# Patient Record
Sex: FEMALE | Race: WHITE
[De-identification: names, ages, dates, MRNs, and addresses within clinical notes are randomized per-mention and may not be internally consistent; named-entity substitution may affect disease eponyms.]

---

## 2019-07-10 NOTE — P.HPOB
History of Present Illness


H&P Date: 07/10/19


Chief Complaint: Induction of labor





This is a 19-year-old female  4 para 0 with an estimated date of 

confinement of 2019, estimated gestational age of 39 and one sevenths 

weeks, who presents to labor and delivery with complaints of contractions that 

have been irregular and pressure.  She desires induction of labor.  Prenatal 

course has been essentially uncomplicated.  She was using marijuana at the 

beginning of the pregnancy for nausea and vomiting and was advised to stop it.  

She has had negative drug screens throughout the rest of her pregnancy.  She 

admits to good fetal movement.





Prenatal labs:


GC/chlamydia-negative


Obstetrical ultrasound-within normal limits


Quad screen-negative


One hour Glucola-128


Group B streptococcus-negative


Blood type-O+


Antibody screen-negative


HIV-nonreactive


Hepatitis B surface antigen-negative


Hemoglobin-13.7


RPR-nonreactive


Rubella-immune





Obstetrical history: .  History of 3 miscarriages.


Gynecologic history: History of chlamydia treated twice in the past.


Social history: She is single.  She is unemployed.





Review of Systems


Constitutional: Denies chills, Denies fever


Eyes: denies blurred vision, denies pain


Ears, nose, mouth and throat: Reports hoarseness


Cardiovascular: Denies chest pain, Denies shortness of breath


Respiratory: Denies cough


Gastrointestinal: Reports abdominal pain (Irregular contractions), Denies 

diarrhea, Denies nausea, Denies vomiting


Genitourinary: Reports pelvic pain, Reports pregnant


Musculoskeletal: Reports low back pain


Integumentary: Denies pruritus, Denies rash


Neurological: Denies numbness, Denies weakness


Psychiatric: Denies anxiety, Denies depression





Past Medical History


Past Medical History: No Reported History


Additional Past Surgical History / Comment(s): D&C


Past Psychological History: No Psychological Hx Reported


Smoking Status: Current every day smoker


Past Drug Use History: Marijuana (History in early pregnancy.)





- Past Family History


  ** Mother


Additional Family Medical History / Comment(s): Heart disease





Medications and Allergies


                                Home Medications











 Medication  Instructions  Recorded  Confirmed  Type


 


Pnv,Calcium 72/Iron/Folic Acid 1 each PO 07/10/19  History





[Prenatal Plus Tablet]    








                                    Allergies











Allergy/AdvReac Type Severity Reaction Status Date / Time


 


No Known Allergies Allergy   Verified 07/10/19 21:02














Exam


Osteopathic Statement: *.  No significant issues noted on an osteopathic 

structural exam other than those noted in the History and Physical/Consult.





HEENT: Within normal limits


Heart: Regular rate and rhythm


Lungs: Clear to auscultation bilaterally


Abdomen: 


Cervix: 1-1/2 cm/60%/-2 station


Fetal heart tones: 130s by Doppler


Extremities: Negative Homans





Assessment and Plan


(1) 39 weeks gestation of pregnancy


Status: Acute   Code(s): Z3A.39 - 39 WEEKS GESTATION OF PREGNANCY   SNOMED 

Code(s): 40209101


   


Plan: 





Proceed with oxytocin induction of labor.  Expectant management.  Epidural 

anesthesia if desired.

## 2019-07-11 ENCOUNTER — HOSPITAL ENCOUNTER (INPATIENT)
Dept: HOSPITAL 47 - 4FBP | Age: 19
LOS: 2 days | Discharge: HOME | End: 2019-07-13
Attending: OBSTETRICS & GYNECOLOGY | Admitting: OBSTETRICS & GYNECOLOGY
Payer: COMMERCIAL

## 2019-07-11 VITALS — BODY MASS INDEX: 38 KG/M2

## 2019-07-11 DIAGNOSIS — F17.200: ICD-10-CM

## 2019-07-11 DIAGNOSIS — Z86.19: ICD-10-CM

## 2019-07-11 DIAGNOSIS — Z82.49: ICD-10-CM

## 2019-07-11 DIAGNOSIS — Z3A.39: ICD-10-CM

## 2019-07-11 LAB
BASOPHILS # BLD AUTO: 0 K/UL (ref 0–0.2)
BASOPHILS NFR BLD AUTO: 0 %
EOSINOPHIL # BLD AUTO: 0.2 K/UL (ref 0–0.7)
EOSINOPHIL NFR BLD AUTO: 2 %
ERYTHROCYTE [DISTWIDTH] IN BLOOD BY AUTOMATED COUNT: 3.87 M/UL (ref 3.8–5.4)
ERYTHROCYTE [DISTWIDTH] IN BLOOD: 12.9 % (ref 11.5–15.5)
HCT VFR BLD AUTO: 37.5 % (ref 34–46)
HGB BLD-MCNC: 12.5 GM/DL (ref 11.4–16)
LYMPHOCYTES # SPEC AUTO: 3 K/UL (ref 1–4.8)
LYMPHOCYTES NFR SPEC AUTO: 23 %
MCH RBC QN AUTO: 32.2 PG (ref 25–35)
MCHC RBC AUTO-ENTMCNC: 33.3 G/DL (ref 31–37)
MCV RBC AUTO: 96.9 FL (ref 80–100)
MONOCYTES # BLD AUTO: 0.5 K/UL (ref 0–1)
MONOCYTES NFR BLD AUTO: 4 %
NEUTROPHILS # BLD AUTO: 9.1 K/UL (ref 1.3–7.7)
NEUTROPHILS NFR BLD AUTO: 70 %
PLATELET # BLD AUTO: 257 K/UL (ref 150–450)
WBC # BLD AUTO: 12.9 K/UL (ref 4–11)

## 2019-07-11 PROCEDURE — 0KQM0ZZ REPAIR PERINEUM MUSCLE, OPEN APPROACH: ICD-10-PCS

## 2019-07-11 PROCEDURE — 86900 BLOOD TYPING SEROLOGIC ABO: CPT

## 2019-07-11 PROCEDURE — 85025 COMPLETE CBC W/AUTO DIFF WBC: CPT

## 2019-07-11 PROCEDURE — 86901 BLOOD TYPING SEROLOGIC RH(D): CPT

## 2019-07-11 PROCEDURE — 10907ZC DRAINAGE OF AMNIOTIC FLUID, THERAPEUTIC FROM PRODUCTS OF CONCEPTION, VIA NATURAL OR ARTIFICIAL OPENING: ICD-10-PCS

## 2019-07-11 PROCEDURE — 86850 RBC ANTIBODY SCREEN: CPT

## 2019-07-11 PROCEDURE — 90715 TDAP VACCINE 7 YRS/> IM: CPT

## 2019-07-11 RX ADMIN — IBUPROFEN PRN MG: 600 TABLET ORAL at 20:14

## 2019-07-11 RX ADMIN — POTASSIUM CHLORIDE SCH MLS/HR: 14.9 INJECTION, SOLUTION INTRAVENOUS at 06:23

## 2019-07-11 RX ADMIN — POTASSIUM CHLORIDE SCH MLS/HR: 14.9 INJECTION, SOLUTION INTRAVENOUS at 16:33

## 2019-07-11 RX ADMIN — DOCUSATE SODIUM AND SENNOSIDES SCH EACH: 50; 8.6 TABLET ORAL at 20:14

## 2019-07-11 RX ADMIN — ACETAMINOPHEN PRN MG: 325 TABLET, FILM COATED ORAL at 22:15

## 2019-07-11 RX ADMIN — POTASSIUM CHLORIDE SCH MLS/HR: 14.9 INJECTION, SOLUTION INTRAVENOUS at 12:10

## 2019-07-11 NOTE — P.PROBDLV
Vaginal Delivery Note





- .


Vaginal Delivery Note: 





The patient progressed to complete dilation after oxytocin induction of labor 

and artificial rupture membranes with clear fluid noted.  She did receive 

epidural anesthesia.  Once reaching complete dilation, she began pushing.  

Infant's head came to a crown.  With one further push, the infant's head 

delivered across the perineum in occiput anterior lie followed by the right 

shoulder and the remainder the body.  Nose and mouth were bulb suctioned after 

delivery.  Brisk cry was noted immediately.  Infant was placed on mother's 

abdomen.  Cord was clamped and cut.  Infant was then taken to warmer for 

evaluation.  A viable male infant is noted with Apgar scores of 9 at 1 minute 

and 9 at 5 minutes and infant weight is 6 lbs. 12 oz.  Placenta delivered 

shortly thereafter, intact, with a three-vessel cord.  Uterus initially was 

boggy but did contract better after manual massage and oxytocin opened up.  I 

did place a gloved hand within the endometrial cavity and no further placental 

tissue was palpated.  Uterus finally did contract down and bleeding did slow.  

Inspection of the perineum revealed a small second-degree perineal laceration.  

This area was anesthetized with 1% lidocaine and then sutured with 3-0 Vicryl 

suture in the usual multilayer fashion.  Estimated blood loss is approximately 

350 mL's.  Mother and infant are in stable condition.

## 2019-07-12 LAB
BASOPHILS # BLD AUTO: 0 K/UL (ref 0–0.2)
BASOPHILS NFR BLD AUTO: 0 %
EOSINOPHIL # BLD AUTO: 0.2 K/UL (ref 0–0.7)
EOSINOPHIL NFR BLD AUTO: 1 %
ERYTHROCYTE [DISTWIDTH] IN BLOOD BY AUTOMATED COUNT: 3.42 M/UL (ref 3.8–5.4)
ERYTHROCYTE [DISTWIDTH] IN BLOOD: 12.9 % (ref 11.5–15.5)
HCT VFR BLD AUTO: 33.5 % (ref 34–46)
HGB BLD-MCNC: 11.2 GM/DL (ref 11.4–16)
LYMPHOCYTES # SPEC AUTO: 2.5 K/UL (ref 1–4.8)
LYMPHOCYTES NFR SPEC AUTO: 16 %
MCH RBC QN AUTO: 32.8 PG (ref 25–35)
MCHC RBC AUTO-ENTMCNC: 33.5 G/DL (ref 31–37)
MCV RBC AUTO: 97.9 FL (ref 80–100)
MONOCYTES # BLD AUTO: 0.6 K/UL (ref 0–1)
MONOCYTES NFR BLD AUTO: 4 %
NEUTROPHILS # BLD AUTO: 11.6 K/UL (ref 1.3–7.7)
NEUTROPHILS NFR BLD AUTO: 77 %
PLATELET # BLD AUTO: 245 K/UL (ref 150–450)
WBC # BLD AUTO: 15 K/UL (ref 4–11)

## 2019-07-12 RX ADMIN — DOCUSATE SODIUM AND SENNOSIDES SCH: 50; 8.6 TABLET ORAL at 20:04

## 2019-07-12 RX ADMIN — ACETAMINOPHEN PRN MG: 325 TABLET, FILM COATED ORAL at 14:56

## 2019-07-12 RX ADMIN — ACETAMINOPHEN PRN MG: 325 TABLET, FILM COATED ORAL at 07:35

## 2019-07-12 RX ADMIN — IBUPROFEN PRN MG: 600 TABLET ORAL at 10:26

## 2019-07-12 RX ADMIN — IBUPROFEN PRN MG: 600 TABLET ORAL at 20:16

## 2019-07-12 RX ADMIN — IBUPROFEN PRN MG: 600 TABLET ORAL at 03:27

## 2019-07-12 RX ADMIN — DOCUSATE SODIUM AND SENNOSIDES SCH EACH: 50; 8.6 TABLET ORAL at 07:35

## 2019-07-12 NOTE — P.DS
Providers


Date of admission: 


19 06:00





Expected date of discharge: 19


Attending physician: 


Yulia Gallagher





Primary care physician: 


Stated None








- Discharge Diagnosis(es)


(1) 39 weeks gestation of pregnancy


Current Visit: No   Status: Acute   


Hospital Course: 





This is a 19-year-old female  4 para 0 at 39 and one sevenths weeks who 

presented for induction of labor.  She underwent oxytocin induction of labor and

did receive epidural anesthesia.  She delivered vaginally a viable male infant 

on 2019 with Apgar scores of 9 at 1 minute and 9 at 5 minutes and infant 

weight of 6 lbs. 12 oz.  Her postpartum course has been essentially 

uncomplicated.  Her bleeding has slowed significantly.  Pain is fairly well 

controlled with ibuprofen.  She is bottle feeding.  Vital signs are stable.  

Abdomen is soft with fundus firm and nontender.  Extremities show negative 

Homans.  Impression is status post vaginal delivery postpartum day #1.  Plan is 

to discharge home today.  Routine postpartum instructions are given.  She is 

advised follow-up in the office in 6 weeks for a postpartum check.  She is 

advised to call the office if she has any further questions or concerns prior to

her appointment time.  She will be given a prescription for ibuprofen.


Procedures: 





Oxytocin induction of labor


Spontaneous vaginal delivery of a viable male infant on 2019


Patient Condition at Discharge: Stable





Plan - Discharge Summary


New Discharge Prescriptions: 


New


   Ibuprofen [Motrin] 600 mg PO Q6HR PRN #60 tab


     PRN Reason: Mild Pain Or Fever >= 100.5





Continue


   Pnv,Calcium 72/Iron/Folic Acid [Prenatal Plus Tablet] 1 each PO DAILY


Discharge Medication List





Pnv,Calcium 72/Iron/Folic Acid [Prenatal Plus Tablet] 1 each PO DAILY 07/10/19 

[History]


Ibuprofen [Motrin] 600 mg PO Q6HR PRN #60 tab 19 [Rx]








Follow up Appointment(s)/Referral(s): 


Yulia Gallagher DO [Doctor of Osteopathic Medicine] - 6 Weeks


Activity/Diet/Wound Care/Special Instructions: 


Postpartum Instructions





1.  Do not begin any exercise program for 3 weeks.


2.  Do not resume sexual relations for 3 weeks or longer if uncomfortable.


3.  You may take tub baths or showers at any time.


4.  You may use tampons if desired after 3 weeks.


5.  Keep the area of episiotomy (stitches) clean and dry.


6.  If you are not nursing, wear a good fitting, supportive bra during the day 

and limit fluid intake for at least 1 week to prevent breast engorgement.


7.  Call the office, [phone], within the next week to make appointment for your

6 week checkup if it has not already been made.


8.  Report any of the following occurrences to the doctor promptly:


     a.  Heavy, excessive bleeding


     b.  Chills, fever


     c.  Burning or frequency of urination


     d.  Pain or redness and breasts if nursing


     e.  Increasing pain or swelling in episiotomy (stitches).











In addition to the above instructions, the following additional should be 

followed:


1.  No heavy lifting or straining (exercising) until after 6 week checkup.


2.  Keep abdominal incision clean and dry: You may wear a dressing if more 

comfortable.


3.  Make office appointment for 10 days after going home or as instructed by her

doctor.


Discharge Disposition: HOME SELF-CARE

## 2019-07-13 VITALS
DIASTOLIC BLOOD PRESSURE: 59 MMHG | RESPIRATION RATE: 16 BRPM | TEMPERATURE: 97.5 F | HEART RATE: 68 BPM | SYSTOLIC BLOOD PRESSURE: 111 MMHG

## 2019-07-13 RX ADMIN — IBUPROFEN PRN MG: 600 TABLET ORAL at 04:09

## 2019-07-13 RX ADMIN — ACETAMINOPHEN PRN MG: 325 TABLET, FILM COATED ORAL at 07:48

## 2019-07-13 RX ADMIN — ACETAMINOPHEN PRN MG: 325 TABLET, FILM COATED ORAL at 01:22

## 2019-07-13 RX ADMIN — IBUPROFEN PRN MG: 600 TABLET ORAL at 11:15

## 2019-07-13 RX ADMIN — DOCUSATE SODIUM AND SENNOSIDES SCH: 50; 8.6 TABLET ORAL at 08:03
